# Patient Record
Sex: MALE | Race: BLACK OR AFRICAN AMERICAN | NOT HISPANIC OR LATINO | Employment: FULL TIME | URBAN - METROPOLITAN AREA
[De-identification: names, ages, dates, MRNs, and addresses within clinical notes are randomized per-mention and may not be internally consistent; named-entity substitution may affect disease eponyms.]

---

## 2023-08-04 ENCOUNTER — HOSPITAL ENCOUNTER (OUTPATIENT)
Facility: HOSPITAL | Age: 33
Discharge: HOME | End: 2023-08-04
Attending: EMERGENCY MEDICINE | Admitting: EMERGENCY MEDICINE
Payer: MEDICAID

## 2023-08-04 VITALS
RESPIRATION RATE: 16 BRPM | SYSTOLIC BLOOD PRESSURE: 160 MMHG | TEMPERATURE: 97.6 F | BODY MASS INDEX: 29.35 KG/M2 | WEIGHT: 205 LBS | OXYGEN SATURATION: 98 % | HEIGHT: 70 IN | HEART RATE: 68 BPM | DIASTOLIC BLOOD PRESSURE: 93 MMHG

## 2023-08-04 DIAGNOSIS — K62.89 RECTAL PAIN: ICD-10-CM

## 2023-08-04 DIAGNOSIS — K64.9 ACUTE HEMORRHOID: Primary | ICD-10-CM

## 2023-08-04 PROCEDURE — G0463 HOSPITAL OUTPT CLINIC VISIT: HCPCS | Performed by: EMERGENCY MEDICINE

## 2023-08-04 RX ORDER — HYDROCODONE BITARTRATE AND ACETAMINOPHEN 10; 325 MG/1; MG/1
1 TABLET ORAL EVERY 6 HOURS PRN
Qty: 10 TABLET | Refills: 0 | Status: SHIPPED | OUTPATIENT
Start: 2023-08-04 | End: 2023-08-04 | Stop reason: SDUPTHER

## 2023-08-04 RX ORDER — METRONIDAZOLE 500 MG/1
500 TABLET ORAL 3 TIMES DAILY
Qty: 21 TABLET | Refills: 0 | Status: SHIPPED | OUTPATIENT
Start: 2023-08-04 | End: 2023-08-04 | Stop reason: SDUPTHER

## 2023-08-04 RX ORDER — HYDROCODONE BITARTRATE AND ACETAMINOPHEN 10; 325 MG/1; MG/1
1 TABLET ORAL EVERY 6 HOURS PRN
Qty: 10 TABLET | Refills: 0 | Status: SHIPPED | OUTPATIENT
Start: 2023-08-04

## 2023-08-04 RX ORDER — METRONIDAZOLE 500 MG/1
500 TABLET ORAL 3 TIMES DAILY
Qty: 21 TABLET | Refills: 0 | Status: SHIPPED | OUTPATIENT
Start: 2023-08-04 | End: 2023-08-11

## 2023-08-04 NOTE — DISCHARGE INSTRUCTIONS
Please take medications as prescribed.  Take take a bath after every bowel movements ensure the area is clean.  Follow-up with general surgery.  Return if symptoms worsen or any concerns.

## 2023-08-04 NOTE — NURSING NOTE
Received call from patient stating that [facility] did not have medications prescribed and that they called [facility] in [place] and they had both medications in stock. MD Javed aware and re-prescribed medications. This RN called Melissa to cancel original Hydrocodone script.

## 2023-08-04 NOTE — FSED PROVIDER NOTE
Subjective   History of Present Illness  Patient 32-year-old man complaining of rectal pain for several months.  Patient states over the past several days he has had increased pain from a hemorrhoid.  He has had intermittent bleeding with bowel movements.  He had no fevers or abdominal pain or back pain.  Pain is moderate intensity and worse with sitting down and palpation.  Patient has tried over-the-counter hemorrhoid medications without relief of his symptoms.    Review of Systems    No past medical history on file.    No Known Allergies    No past surgical history on file.    No family history on file.    Social History     Socioeconomic History    Marital status: Single           Objective   Physical Exam  Vitals and nursing note reviewed.   Constitutional:       General: He is in acute distress.      Appearance: Normal appearance. He is not ill-appearing or toxic-appearing.   HENT:      Head: Normocephalic and atraumatic.      Nose: Nose normal.      Mouth/Throat:      Mouth: Mucous membranes are moist.   Eyes:      Extraocular Movements: Extraocular movements intact.   Pulmonary:      Effort: Pulmonary effort is normal.   Abdominal:      Palpations: Abdomen is soft.      Tenderness: There is no abdominal tenderness. There is no right CVA tenderness or left CVA tenderness.   Genitourinary:     Comments: Rectal examination with a 1 cm thrombosed hemorrhoid.  There is tenderness to palpation.  Musculoskeletal:         General: Tenderness present. Normal range of motion.      Cervical back: Normal range of motion.   Skin:     General: Skin is warm and dry.      Capillary Refill: Capillary refill takes less than 2 seconds.   Neurological:      General: No focal deficit present.      Mental Status: He is alert.       Procedures     Incision of thrombosed hemorrhoid.  Area was prepped with Betadine and allowed to dry.  Hemorrhoid was infiltrated with 0.5% Sensorcaine without epinephrine.  #11 blade was used to make  a stab incision.  Small clot expressed.  No purulent drainage.  Dressing applied over.  Patient tolerated procedure well without complication.      ED Course                                           Medical Decision Making  Problems Addressed:  Acute hemorrhoid: complicated acute illness or injury  Rectal pain: complicated acute illness or injury    Risk  Prescription drug management.    Patient 32-year-old man complaining of rectal pain for several months.  He has had hemorrhoids and has had increased pain over the past several days.  Patient has an external hemorrhoid which is 1 cm.  This was incised.  Patient was placed on Flagyl and Norco.  Patient advised to follow-up with general surgery for reevaluation.  Patient also advised to return if any concerns.    Final diagnoses:   Acute hemorrhoid   Rectal pain       ED Disposition  ED Disposition       ED Disposition   Discharge    Condition   Stable    Comment   --               Jose Alberto Grady MD  [address] [place] [address] [phone]    Call       [place] [address] [address] [place] [id number] [phone]    If symptoms worsen         Medication List        New Prescriptions      HYDROcodone-acetaminophen  MG per tablet  Commonly known as: NORCO  Take 1 tablet by mouth Every 6 (Six) Hours As Needed for Moderate Pain.     metroNIDAZOLE 500 MG tablet  Commonly known as: FLAGYL  Take 1 tablet by mouth 3 (Three) Times a Day for 7 days.               Where to Get Your Medications        These medications were sent to [id number] - [place], [place] - [address] AT [place] [address] [address] - [phone] [facility] [phone] FX  [address] [facility] [place] [address]      Phone: [phone]   HYDROcodone-acetaminophen  MG per tablet  metroNIDAZOLE 500 MG tablet

## 2023-08-10 ENCOUNTER — OFFICE VISIT (OUTPATIENT)
Dept: SURGERY | Facility: CLINIC | Age: 33
End: 2023-08-10
Payer: MEDICAID

## 2023-08-10 ENCOUNTER — TELEPHONE (OUTPATIENT)
Dept: SURGERY | Facility: CLINIC | Age: 33
End: 2023-08-10

## 2023-08-10 VITALS
HEIGHT: 70 IN | OXYGEN SATURATION: 94 % | SYSTOLIC BLOOD PRESSURE: 162 MMHG | BODY MASS INDEX: 28.49 KG/M2 | DIASTOLIC BLOOD PRESSURE: 113 MMHG | WEIGHT: 199 LBS | TEMPERATURE: 99.1 F | HEART RATE: 99 BPM

## 2023-08-10 DIAGNOSIS — K64.5 THROMBOSED EXTERNAL HEMORRHOID: ICD-10-CM

## 2023-08-10 DIAGNOSIS — K60.2 ANAL FISSURE: Primary | ICD-10-CM

## 2023-08-10 PROCEDURE — 1159F MED LIST DOCD IN RCRD: CPT | Performed by: SURGERY

## 2023-08-10 PROCEDURE — 99203 OFFICE O/P NEW LOW 30 MIN: CPT | Performed by: SURGERY

## 2023-08-10 PROCEDURE — 1160F RVW MEDS BY RX/DR IN RCRD: CPT | Performed by: SURGERY

## 2023-08-10 RX ORDER — HYDROCORTISONE ACETATE 25 MG/1
SUPPOSITORY RECTAL
COMMUNITY
Start: 2023-08-08

## 2023-08-10 RX ORDER — DOCUSATE SODIUM 100 MG/1
100 CAPSULE, LIQUID FILLED ORAL 2 TIMES DAILY
Qty: 60 CAPSULE | Refills: 1 | Status: SHIPPED | OUTPATIENT
Start: 2023-08-10

## 2023-08-10 NOTE — PROGRESS NOTES
CHIEF COMPLAINT:    Anal pain    HISTORY OF PRESENT ILLNESS:    Jaci Canela is a 32 y.o. male who was recently seen by the [facility] emergency department with anal pain of about 1-1/2 weeks duration.  The patient notes that this was related to a recent bout of constipation.  At the time of his presentation to the [facility] ED it was felt that he had a thrombosed external hemorrhoid.  This area was lanced by the emergency department with a small amount of clot evacuated.  The patient was then asked to follow-up with me.    The time my visit with him he states that he is having significant pain in the area.  His last bowel movement was 2 days ago and had some blood in it.  He has been eating very little at home.  He states that he has been unable to go to work due to the pain.  He has never had this problem before.    History reviewed. No pertinent past medical history.    Past Surgical History:   Procedure Laterality Date    HAND SURGERY         Prior to Admission medications    Medication Sig Start Date End Date Taking? Authorizing Provider   HYDROcodone-acetaminophen (NORCO)  MG per tablet Take 1 tablet by mouth Every 6 (Six) Hours As Needed for Moderate Pain. 8/4/23  Yes Gerardo Javed MD   hydrocortisone (ANUSOL-HC) 25 MG suppository UNWRAP & INSERT 1 SUPPOSITORY RECTALLY EVERY 12 HOURS AS DIRECTED 8/8/23  Yes Provider, MD Guru   metroNIDAZOLE (FLAGYL) 500 MG tablet Take 1 tablet by mouth 3 (Three) Times a Day for 7 days. 8/4/23 8/11/23 Yes Gerardo Javed MD       No Known Allergies    Family History   Problem Relation Age of Onset    No Known Problems Mother     No Known Problems Father        Social History     Socioeconomic History    Marital status: Single   Tobacco Use    Smoking status: Every Day     Packs/day: 0.50     Types: Cigarettes     Passive exposure: Current    Smokeless tobacco: Never   Substance and Sexual Activity    Alcohol use: Defer    Drug use: Defer     "Sexual activity: Defer       Review of Systems   Gastrointestinal:  Positive for anal bleeding, constipation and rectal pain. Negative for nausea.     Objective     BP (!) 162/113   Pulse 99   Temp 99.1 øF (37.3 øC) (Infrared)   Ht 177.8 cm (70\")   Wt 90.3 kg (199 lb)   SpO2 94%   BMI 28.55 kg/mý     Physical Exam  Constitutional:       Appearance: Normal appearance. He is not diaphoretic.   HENT:      Head: Normocephalic and atraumatic.   Eyes:      General:         Right eye: No discharge.         Left eye: No discharge.      Extraocular Movements: Extraocular movements intact.      Conjunctiva/sclera: Conjunctivae normal.   Pulmonary:      Effort: Pulmonary effort is normal. No respiratory distress.   Genitourinary:      Neurological:      General: No focal deficit present.      Mental Status: He is alert and oriented to person, place, and time.   Psychiatric:         Mood and Affect: Mood normal.         Behavior: Behavior normal.         Thought Content: Thought content normal.         Judgment: Judgment normal.         ASSESSMENT:    Recent constipation with apparent thrombosed external hemorrhoid which was lanced at the freestanding ED, probable anal fissure.    PLAN:    Today in the office our exam is fairly limited given the patient's discomfort.  He does appear to have a sentinel pile versus a small residual hemorrhoidal skin tag in the posterior midline region.  My suspicion is that he had significant constipation which resulted in potentially an anal fissure as well as a small thrombosed external hemorrhoid which has already been treated.  He is continuing to have significant pain.  We have provided him a prescription for topical cream that includes numbing as well as calcium channel blockers to see if we get the area to heal.  I recommended that he take in plenty of fluids as well as use stool softeners to help avoid constipation.  I will see him back next week to assess progress and potential " need for surgery.          This document has been electronically signed by Rosas Mitchell MD on August 10, 2023 09:20 EDT